# Patient Record
(demographics unavailable — no encounter records)

---

## 2018-05-05 NOTE — RAD
Exam:  AP portable chest

 

History:  Chest pain for one day.

 

Comparison:  None.

 

Findings:  The heart and mediastinal structures are within normal limits 

for size. Lungs are without infiltrate.  No pleural effusion or 

pneumothorax is identified.

 

Impression: 

1.  No acute cardiopulmonary process.

 

Electronically signed by: William Weathers MD (5/5/2018 2:56 PM) [facility]

## 2018-05-05 NOTE — ED.ADGEN
Past History


Past Medical History:  GERD


Past Surgical History:  Appendectomy, Hysterectomy


Alcohol Use:  Rarely


Drug Use:  None





Adult General


Chief Complaint


Chief Complaint


Shoulder pain





HPI


HPI





Patient is a 47-year-old female presents with left shoulder pain times one 

week. Pain lasts for hours at a time. It does not reproduce with position change

, movement or exertion. Pain is nonradiating. Patient denies nausea shortness 

of breath and sweats. Patient did report brief episodes of palpitation with 

slight chest tightness lasting less than 5 minutes while in the car prior to ED 

arrival. Symptoms have since resolved. Patient does report occasional 

palpitations after drinking caffeine. Also reports history of reflux disease. 

Denies personal history of hypertension, dyslipidemia, diabetes or smoking. No 

family history of early coronary disease below the age of 50.  does live 

at the boxes at a [employer] work and states is not experience chest pain, or 

arm pain while at work. No pain or swelling. No other acute symptoms or 

complaints.[]





Review of Systems


Review of Systems


Review symptoms as per history of present illness. All other review symptoms 

are negative.]





All other systems were reviewed and found to be within normal limits, except as 

documented in this note.





Current Medications


Current Medications





Current Medications








 Medications


  (Trade)  Dose


 Ordered  Sig/Faviola  Start Time


 Stop Time Status Last Admin


Dose Admin


 


 Nitroglycerin


  (Nitrostat)  0.4 mg  1X  ONCE  5/5/18 15:30


 5/5/18 15:31 DC  


 











Allergies


Allergies





Allergies








Coded Allergies Type Severity Reaction Last Updated Verified


 


  No Known Drug Allergies    5/5/18 No











Physical Exam


Physical Exam





Constitutional: Well developed, well nourished, no acute distress, non-toxic 

appearance. []


HENT: Normocephalic, atraumatic, bilateral external ears normal, oropharynx 

moist, no oral exudates, nose normal. []


Eyes: PERRLA, EOMI, conjunctiva normal, no discharge. [] 


Neck: Normal range of motion, no tenderness, supple, no stridor. [] 


Cardiovascular:Heart rate regular rhythm, no murmur []


Lungs & Thorax:  Bilateral breath sounds clear to auscultation []


Abdomen: Bowel sounds normal, soft, no tenderness. [] 


Skin: Warm, dry, no erythema, no rash. [] 


Back: No tenderness. [] 


Extremities: Shoulder pain, no motor weakness or loss of sensation.[] 


Neurologic: Alert and oriented X 3, normal motor function, normal sensory 

function, no focal deficits noted. []


Psychologic: Affect normal, judgement normal, mood normal. []





Current Patient Data


Vital Signs





 Vital Signs








  Date Time  Temp Pulse Resp B/P (MAP) Pulse Ox O2 Delivery O2 Flow Rate FiO2


 


5/5/18 14:25 98.0 70 16  96 Room Air  








Lab Results





 Laboratory Tests








Test


  5/5/18


14:22


 


White Blood Count


  13.8 x10^3/uL


(4.0-11.0)  H


 


Red Blood Count


  4.63 x10^6/uL


(3.50-5.40)


 


Hemoglobin


  13.8 g/dL


(12.0-15.5)


 


Hematocrit


  40.4 %


(36.0-47.0)


 


Mean Corpuscular Volume


  87 fL ()


 


 


Mean Corpuscular Hemoglobin 30 pg (25-35)  


 


Mean Corpuscular Hemoglobin


Concent 34 g/dL


(31-37)


 


Red Cell Distribution Width


  12.9 %


(11.5-14.5)


 


Platelet Count


  271 x10^3/uL


(140-400)


 


Neutrophils (%) (Auto) 79 % (31-73)  H


 


Lymphocytes (%) (Auto) 15 % (24-48)  L


 


Monocytes (%) (Auto) 5 % (0-9)  


 


Eosinophils (%) (Auto) 2 % (0-3)  


 


Basophils (%) (Auto) 0 % (0-3)  


 


Neutrophils # (Auto)


  10.8 x10^3uL


(1.8-7.7)  H


 


Lymphocytes # (Auto)


  2.0 x10^3/uL


(1.0-4.8)


 


Monocytes # (Auto)


  0.6 x10^3/uL


(0.0-1.1)


 


Eosinophils # (Auto)


  0.3 x10^3/uL


(0.0-0.7)


 


Basophils # (Auto)


  0.0 x10^3/uL


(0.0-0.2)


 


Sodium Level


  142 mmol/L


(136-145)


 


Potassium Level


  4.1 mmol/L


(3.5-5.1)


 


Chloride Level


  105 mmol/L


()


 


Carbon Dioxide Level


  28 mmol/L


(21-32)


 


Anion Gap 9 (6-14)  


 


Blood Urea Nitrogen


  14 mg/dL


(7-20)


 


Creatinine


  0.7 mg/dL


(0.6-1.0)


 


Estimated GFR


(Cockcroft-Gault) 89.7  


 


 


BUN/Creatinine Ratio 20 (6-20)  


 


Glucose Level


  98 mg/dL


(70-99)


 


Calcium Level


  9.1 mg/dL


(8.5-10.1)


 


Total Bilirubin


  0.4 mg/dL


(0.2-1.0)


 


Aspartate Amino Transferase


(AST) 20 U/L (15-37)


 


 


Alanine Aminotransferase (ALT)


  34 U/L (14-59)


 


 


Alkaline Phosphatase


  56 U/L


()


 


Troponin I Quantitative


  < 0.017 ng/mL


(0-0.055)


 


Total Protein


  7.5 g/dL


(6.4-8.2)


 


Albumin


  3.9 g/dL


(3.4-5.0)


 


Albumin/Globulin Ratio 1.1 (1.0-1.7)  











EKG


EKG


[EKG: Normal sinus rhythm, rate 58, no acute ST-T wave changes, . 

Interpretation by this physician.]





Radiology/Procedures


Radiology/Procedures


[Chest x-ray: No acute cardiopulmonary disease]





Course & Med Decision Making


Course & Med Decision Making


Pertinent Labs and Imaging studies reviewed. (See chart for details)





[Atypical chest pain. Lab work, EKG, chest x-ray nondiagnostic. arm pain 

resolved while in the ED prior and then briefly prior to NTG and then left.  

Patient is low risk and has 0 additional risk factors. Patient attributes her 

current symptoms to increased ascites and stress of the workplace. Patient 

offered for further evaluation of possible coronary disease recommended. 

Patient prefers to follow-up with primary care physician for further evaluation 

and outpatient testing. She agrees to return to the emergency department should 

the chest pain resolved.]





Final Impression


Final Impression


[1. left arm pain]


Problems:  





Dragon Disclaimer


Dragon Disclaimer


This electronic medical record was generated, in whole or in part, using a 

voice recognition dictation system.











ROSANNE SYKES DO May 5, 2018 14:47

## 2018-05-06 NOTE — EKG
[facility] [address] [place] [address]

Test Date:    2018               Test Time:    14:20:05

Pat Name:     BRIE ALICEA          Department:   

Patient ID:   [id number]           Room:          

Gender:       F                        Technician:   DREW

:          1970               Requested By: ROSANNE SYKES

Order Number: [id number]            Reading MD:     

                                 Measurements

Intervals                              Axis          

Rate:         58                       P:            -[id number]

MT:           130                      QRS:          20

QRSD:         98                       T:            28

QT:           402                                    

QTc:          394                                    

                           Interpretive Statements

SINUS RHYTHM

NO SPECIFIC ECG ABNORMALITIES

RI6.01

No previous ECG available for comparison